# Patient Record
Sex: MALE | Race: BLACK OR AFRICAN AMERICAN | Employment: OTHER | URBAN - METROPOLITAN AREA
[De-identification: names, ages, dates, MRNs, and addresses within clinical notes are randomized per-mention and may not be internally consistent; named-entity substitution may affect disease eponyms.]

---

## 2021-09-07 ENCOUNTER — APPOINTMENT (OUTPATIENT)
Dept: CT IMAGING | Age: 56
End: 2021-09-07
Payer: MEDICARE

## 2021-09-07 ENCOUNTER — HOSPITAL ENCOUNTER (EMERGENCY)
Age: 56
Discharge: HOME | End: 2021-09-07
Attending: EMERGENCY MEDICINE
Payer: MEDICARE

## 2021-09-07 ENCOUNTER — APPOINTMENT (OUTPATIENT)
Dept: GENERAL RADIOLOGY | Age: 56
End: 2021-09-07
Payer: MEDICARE

## 2021-09-07 VITALS
DIASTOLIC BLOOD PRESSURE: 94 MMHG | TEMPERATURE: 98.2 F | RESPIRATION RATE: 18 BRPM | HEART RATE: 93 BPM | OXYGEN SATURATION: 97 % | SYSTOLIC BLOOD PRESSURE: 160 MMHG
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
## 2021-09-07 LAB
ABO/RH: NORMAL
ALLEN TEST: ABNORMAL
ANION GAP SERPL CALCULATED.3IONS-SCNC: 11 MMOL/L (ref 9–17)
ANTIBODY SCREEN: NEGATIVE
ARM BAND NUMBER: NORMAL
BLOOD BANK SPECIMEN: ABNORMAL
BUN BLDV-MCNC: 16 MG/DL (ref 6–20)
CARBOXYHEMOGLOBIN: 0.5 % (ref 0–5)
CHLORIDE BLD-SCNC: 105 MMOL/L (ref 98–107)
CO2: 25 MMOL/L (ref 20–31)
CREAT SERPL-MCNC: 0.66 MG/DL (ref 0.7–1.2)
EKG ATRIAL RATE: 88 BPM
EKG P AXIS: 48 DEGREES
EKG P-R INTERVAL: 184 MS
EKG Q-T INTERVAL: 356 MS
EKG QRS DURATION: 80 MS
EKG QTC CALCULATION (BAZETT): 430 MS
EKG R AXIS: 11 DEGREES
EKG T AXIS: 27 DEGREES
EKG VENTRICULAR RATE: 88 BPM
ETHANOL PERCENT: <0.01 %
ETHANOL: <10 MG/DL
EXPIRATION DATE: NORMAL
FIO2: ABNORMAL
GFR AFRICAN AMERICAN: >60 ML/MIN
GFR NON-AFRICAN AMERICAN: >60 ML/MIN
GFR SERPL CREATININE-BSD FRML MDRD: ABNORMAL ML/MIN/{1.73_M2}
GFR SERPL CREATININE-BSD FRML MDRD: ABNORMAL ML/MIN/{1.73_M2}
GLUCOSE BLD-MCNC: 193 MG/DL (ref 70–99)
HCG QUALITATIVE: ABNORMAL
HCO3 VENOUS: 25.8 MMOL/L (ref 24–30)
HCT VFR BLD CALC: 33.8 % (ref 40.7–50.3)
HEMOGLOBIN: 11 G/DL (ref 13–17)
INR BLD: 1
MCH RBC QN AUTO: 27.1 PG (ref 25.2–33.5)
MCHC RBC AUTO-ENTMCNC: 32.5 G/DL (ref 28.4–34.8)
MCV RBC AUTO: 83.3 FL (ref 82.6–102.9)
METHEMOGLOBIN: ABNORMAL % (ref 0–1.5)
MODE: ABNORMAL
NEGATIVE BASE EXCESS, VEN: ABNORMAL MMOL/L (ref 0–2)
NOTIFICATION TIME: ABNORMAL
NOTIFICATION: ABNORMAL
NRBC AUTOMATED: 0 PER 100 WBC
O2 DEVICE/FLOW/%: ABNORMAL
O2 SAT, VEN: 53.8 % (ref 60–85)
OXYHEMOGLOBIN: ABNORMAL % (ref 95–98)
PARTIAL THROMBOPLASTIN TIME: 24.5 SEC (ref 20.5–30.5)
PATIENT TEMP: 37
PCO2, VEN, TEMP ADJ: ABNORMAL MMHG (ref 39–55)
PCO2, VEN: 46 (ref 39–55)
PDW BLD-RTO: 12.9 % (ref 11.8–14.4)
PEEP/CPAP: ABNORMAL
PH VENOUS: 7.37 (ref 7.32–7.42)
PH, VEN, TEMP ADJ: ABNORMAL (ref 7.32–7.42)
PLATELET # BLD: 266 K/UL (ref 138–453)
PMV BLD AUTO: 10.1 FL (ref 8.1–13.5)
PO2, VEN, TEMP ADJ: ABNORMAL MMHG (ref 30–50)
PO2, VEN: 31.3 (ref 30–50)
POSITIVE BASE EXCESS, VEN: 0.8 MMOL/L (ref 0–2)
POTASSIUM SERPL-SCNC: 3.5 MMOL/L (ref 3.7–5.3)
PROTHROMBIN TIME: 10.6 SEC (ref 9.1–12.3)
PSV: ABNORMAL
PT. POSITION: ABNORMAL
RBC # BLD: 4.06 M/UL (ref 4.21–5.77)
RESPIRATORY RATE: ABNORMAL
SAMPLE SITE: ABNORMAL
SET RATE: ABNORMAL
SODIUM BLD-SCNC: 141 MMOL/L (ref 135–144)
TEXT FOR RESPIRATORY: ABNORMAL
TOTAL HB: ABNORMAL G/DL (ref 12–16)
TOTAL RATE: ABNORMAL
TROPONIN INTERP: ABNORMAL
TROPONIN T: ABNORMAL NG/ML
TROPONIN, HIGH SENSITIVITY: 32 NG/L (ref 0–22)
TROPONIN, HIGH SENSITIVITY: 32 NG/L (ref 0–22)
TROPONIN, HIGH SENSITIVITY: 33 NG/L (ref 0–22)
VT: ABNORMAL
WBC # BLD: 4.9 K/UL (ref 3.5–11.3)

## 2021-09-07 PROCEDURE — 82947 ASSAY GLUCOSE BLOOD QUANT: CPT

## 2021-09-07 PROCEDURE — 6360000004 HC RX CONTRAST MEDICATION: Performed by: STUDENT IN AN ORGANIZED HEALTH CARE EDUCATION/TRAINING PROGRAM

## 2021-09-07 PROCEDURE — 86900 BLOOD TYPING SEROLOGIC ABO: CPT

## 2021-09-07 PROCEDURE — 85027 COMPLETE CBC AUTOMATED: CPT

## 2021-09-07 PROCEDURE — 72125 CT NECK SPINE W/O DYE: CPT

## 2021-09-07 PROCEDURE — 93005 ELECTROCARDIOGRAM TRACING: CPT | Performed by: STUDENT IN AN ORGANIZED HEALTH CARE EDUCATION/TRAINING PROGRAM

## 2021-09-07 PROCEDURE — 82805 BLOOD GASES W/O2 SATURATION: CPT

## 2021-09-07 PROCEDURE — 70450 CT HEAD/BRAIN W/O DYE: CPT

## 2021-09-07 PROCEDURE — 3209999900 CT THORACIC SPINE TRAUMA RECONSTRUCTION

## 2021-09-07 PROCEDURE — 96374 THER/PROPH/DIAG INJ IV PUSH: CPT

## 2021-09-07 PROCEDURE — 86901 BLOOD TYPING SEROLOGIC RH(D): CPT

## 2021-09-07 PROCEDURE — 86850 RBC ANTIBODY SCREEN: CPT

## 2021-09-07 PROCEDURE — 73590 X-RAY EXAM OF LOWER LEG: CPT

## 2021-09-07 PROCEDURE — 80051 ELECTROLYTE PANEL: CPT

## 2021-09-07 PROCEDURE — 6370000000 HC RX 637 (ALT 250 FOR IP): Performed by: STUDENT IN AN ORGANIZED HEALTH CARE EDUCATION/TRAINING PROGRAM

## 2021-09-07 PROCEDURE — 3209999900 CT LUMBAR SPINE TRAUMA RECONSTRUCTION

## 2021-09-07 PROCEDURE — 6360000002 HC RX W HCPCS: Performed by: STUDENT IN AN ORGANIZED HEALTH CARE EDUCATION/TRAINING PROGRAM

## 2021-09-07 PROCEDURE — 82565 ASSAY OF CREATININE: CPT

## 2021-09-07 PROCEDURE — 84520 ASSAY OF UREA NITROGEN: CPT

## 2021-09-07 PROCEDURE — 84703 CHORIONIC GONADOTROPIN ASSAY: CPT

## 2021-09-07 PROCEDURE — 71260 CT THORAX DX C+: CPT

## 2021-09-07 PROCEDURE — G0480 DRUG TEST DEF 1-7 CLASSES: HCPCS

## 2021-09-07 PROCEDURE — 84484 ASSAY OF TROPONIN QUANT: CPT

## 2021-09-07 PROCEDURE — 85610 PROTHROMBIN TIME: CPT

## 2021-09-07 PROCEDURE — 71046 X-RAY EXAM CHEST 2 VIEWS: CPT

## 2021-09-07 PROCEDURE — 99285 EMERGENCY DEPT VISIT HI MDM: CPT

## 2021-09-07 PROCEDURE — 85730 THROMBOPLASTIN TIME PARTIAL: CPT

## 2021-09-07 RX ORDER — KETOROLAC TROMETHAMINE 15 MG/ML
15 INJECTION, SOLUTION INTRAMUSCULAR; INTRAVENOUS ONCE
Status: COMPLETED | OUTPATIENT
Start: 2021-09-07 | End: 2021-09-07

## 2021-09-07 RX ORDER — ACETAMINOPHEN 325 MG/1
650 TABLET ORAL ONCE
Status: COMPLETED | OUTPATIENT
Start: 2021-09-07 | End: 2021-09-07

## 2021-09-07 RX ADMIN — KETOROLAC TROMETHAMINE 15 MG: 15 INJECTION, SOLUTION INTRAMUSCULAR; INTRAVENOUS at 02:56

## 2021-09-07 RX ADMIN — IOPAMIDOL 75 ML: 755 INJECTION, SOLUTION INTRAVENOUS at 05:22

## 2021-09-07 RX ADMIN — ACETAMINOPHEN 650 MG: 325 TABLET ORAL at 02:57

## 2021-09-07 ASSESSMENT — ENCOUNTER SYMPTOMS
SHORTNESS OF BREATH: 1
EYE ITCHING: 0
SHORTNESS OF BREATH: 0
SINUS PRESSURE: 0
RHINORRHEA: 0
ABDOMINAL PAIN: 0
SINUS PAIN: 0
CONSTIPATION: 0
CHEST TIGHTNESS: 0
ABDOMINAL DISTENTION: 0
EYE DISCHARGE: 0
EYE REDNESS: 0
BACK PAIN: 0
COLOR CHANGE: 0
DIARRHEA: 0
EYE PAIN: 0
VOMITING: 0
NAUSEA: 0

## 2021-09-07 ASSESSMENT — PAIN DESCRIPTION - PAIN TYPE: TYPE: ACUTE PAIN

## 2021-09-07 ASSESSMENT — PAIN SCALES - GENERAL
PAINLEVEL_OUTOF10: 10
PAINLEVEL_OUTOF10: 10

## 2021-09-07 ASSESSMENT — PAIN DESCRIPTION - DESCRIPTORS: DESCRIPTORS: PRESSURE

## 2021-09-07 ASSESSMENT — PAIN DESCRIPTION - FREQUENCY: FREQUENCY: CONTINUOUS

## 2021-09-07 ASSESSMENT — PAIN DESCRIPTION - ORIENTATION: ORIENTATION: MID

## 2021-09-07 ASSESSMENT — PAIN DESCRIPTION - LOCATION: LOCATION: CHEST
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
## 2021-09-07 NOTE — ED NOTES
Bed: 17  Expected date:   Expected time:   Means of arrival:   Comments:  [address], RN  09/07/21 6980

## 2021-09-07 NOTE — ED NOTES
The following labs labeled with pt sticker and tubed to lab:     [] Blue     [] Lavender   [] on ice  [x] Green/yellow  [] Green/black [] on ice  [] Yellow  [] Red  [] Pink      [] COVID-19 swab    [] Rapid  [] PCR  [] Peds Viral Panel     [] Urine Sample  [] Pelvic Cultures  [] Blood Cultures           Bam Dickens RN  09/07/21 5183
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
## 2021-09-08 LAB
EKG ATRIAL RATE: 73 BPM
EKG P AXIS: 48 DEGREES
EKG P-R INTERVAL: 202 MS
EKG Q-T INTERVAL: 382 MS
EKG QRS DURATION: 72 MS
EKG QTC CALCULATION (BAZETT): 420 MS
EKG R AXIS: 6 DEGREES
EKG T AXIS: 11 DEGREES
EKG VENTRICULAR RATE: 73 BPM

## 2021-09-08 PROCEDURE — 93010 ELECTROCARDIOGRAM REPORT: CPT | Performed by: INTERNAL MEDICINE
